# Patient Record
Sex: FEMALE | Race: WHITE | URBAN - METROPOLITAN AREA
[De-identification: names, ages, dates, MRNs, and addresses within clinical notes are randomized per-mention and may not be internally consistent; named-entity substitution may affect disease eponyms.]

---

## 2019-08-15 ENCOUNTER — IMPORTED ENCOUNTER (OUTPATIENT)
Dept: URBAN - METROPOLITAN AREA CLINIC 50 | Facility: CLINIC | Age: 74
End: 2019-08-15

## 2019-08-15 NOTE — PATIENT DISCUSSION
"""MAC OCT OU done today.  Followed by Dr. Declan Dowling To consider PPV and MP if vision decreases or ""

## 2021-04-18 ASSESSMENT — TONOMETRY
OS_IOP_MMHG: 19
OD_IOP_MMHG: 19

## 2021-04-18 ASSESSMENT — VISUAL ACUITY
OS_CC: J1+
OD_CC: J1+
OD_SC: 20/25-1